# Patient Record
Sex: MALE | URBAN - METROPOLITAN AREA
[De-identification: names, ages, dates, MRNs, and addresses within clinical notes are randomized per-mention and may not be internally consistent; named-entity substitution may affect disease eponyms.]

---

## 2021-04-27 ENCOUNTER — OFFICE VISIT (OUTPATIENT)
Dept: URBAN - METROPOLITAN AREA CLINIC 48 | Facility: CLINIC | Age: 51
End: 2021-04-27
Payer: COMMERCIAL

## 2021-04-27 DIAGNOSIS — E11.3491 TYPE 2 DIAB W SEVERE NONPRLF DIAB RTNOP W/O MACULAR EDEMA, RIGHT EYE: Primary | ICD-10-CM

## 2021-04-27 DIAGNOSIS — E11.3392 TYPE 2 DIAB W MODERATE NONPRLF DIAB RTNOP W/O MACULAR EDEMA, LEFT EYE: ICD-10-CM

## 2021-04-27 DIAGNOSIS — D23.111 OTHER BENIGN NEOPLASM SKIN/ RIGHT UPPER EYELID, INC CANTHUS: ICD-10-CM

## 2021-04-27 PROCEDURE — 92004 COMPRE OPH EXAM NEW PT 1/>: CPT | Performed by: STUDENT IN AN ORGANIZED HEALTH CARE EDUCATION/TRAINING PROGRAM

## 2021-04-27 PROCEDURE — 92134 CPTRZ OPH DX IMG PST SGM RTA: CPT | Performed by: STUDENT IN AN ORGANIZED HEALTH CARE EDUCATION/TRAINING PROGRAM

## 2021-04-27 PROCEDURE — 92133 CPTRZD OPH DX IMG PST SGM ON: CPT | Performed by: STUDENT IN AN ORGANIZED HEALTH CARE EDUCATION/TRAINING PROGRAM

## 2021-04-27 ASSESSMENT — INTRAOCULAR PRESSURE
OD: 15
OS: 19

## 2021-04-27 NOTE — IMPRESSION/PLAN
Impression: Other benign neoplasm skin/ right upper eyelid, inc canthus: D23.111. Plan: Continue to monitor slight erythema if any growth concerning for [facility] can perform bx.

## 2021-04-27 NOTE — IMPRESSION/PLAN
Impression: Type 2 diab w severe nonprlf diab rtnop w/o macular edema, right eye: [id number]. Plan: Will refer to Dr. Santhosh Main for possible NVE can evaluate if PRP is needed.

## 2021-04-27 NOTE — IMPRESSION/PLAN
Impression: Type 2 diab w moderate nonprlf diab rtnop w/o macular edema, left eye: H37.3521.  Plan: see plan 1

## 2021-05-18 ENCOUNTER — OFFICE VISIT (OUTPATIENT)
Dept: URBAN - METROPOLITAN AREA CLINIC 48 | Facility: CLINIC | Age: 51
End: 2021-05-18
Payer: COMMERCIAL

## 2021-05-18 PROCEDURE — 92134 CPTRZ OPH DX IMG PST SGM RTA: CPT | Performed by: OPHTHALMOLOGY

## 2021-05-18 PROCEDURE — 92014 COMPRE OPH EXAM EST PT 1/>: CPT | Performed by: OPHTHALMOLOGY

## 2021-05-18 ASSESSMENT — INTRAOCULAR PRESSURE
OS: 17
OD: 14

## 2021-05-18 NOTE — IMPRESSION/PLAN
Impression: Other benign neoplasm skin/ right upper eyelid, inc canthus: D23.111. Plan: No loss of lashes no don't suspect malignancy.  

Continue to monitor

## 2021-05-18 NOTE — IMPRESSION/PLAN
Impression: Type 2 diab w severe nonprlf diabetic rtnop w macular edema, right eye: E11.3411. Plan: Advised to obtain good blood glucose control, discussed with patient although he has not lost vision if blood sugars are not controlled, vision can decrease. no laser at this time. Schedule Eylea 2mg OU x 2 every 4 weeks then 3 weeks De/OCT Deborrjuan antonio Lovell #1 0-10 days, please get auth Avastin is on Recall and patient is needing his injection this week.

## 2021-05-18 NOTE — IMPRESSION/PLAN
Impression: Type 2 diab w moderate nonprlf diab rtnop w macular edema, left eye: Y94.7965.  Plan: see plan 1

## 2021-05-21 ENCOUNTER — PROCEDURE (OUTPATIENT)
Dept: URBAN - METROPOLITAN AREA CLINIC 48 | Facility: CLINIC | Age: 51
End: 2021-05-21
Payer: COMMERCIAL

## 2021-06-18 ENCOUNTER — PROCEDURE (OUTPATIENT)
Dept: URBAN - METROPOLITAN AREA CLINIC 48 | Facility: CLINIC | Age: 51
End: 2021-06-18
Payer: COMMERCIAL

## 2021-06-18 DIAGNOSIS — E11.3312 TYPE 2 DIABETES MELLITUS WITH MODERATE NONPROLIFERATIVE DIABETIC RETINOPATHY WITH MACULAR EDEMA, LEFT EYE: ICD-10-CM

## 2021-06-18 DIAGNOSIS — E11.3411 TYPE 2 DIABETES MELLITUS WITH SEVERE NONPROLIFERATIVE DIABETIC RETINOPATHY WITH MACULAR EDEMA, RIGHT EYE: Primary | ICD-10-CM

## 2021-09-21 ENCOUNTER — OFFICE VISIT (OUTPATIENT)
Dept: URBAN - METROPOLITAN AREA CLINIC 48 | Facility: CLINIC | Age: 51
End: 2021-09-21
Payer: COMMERCIAL

## 2021-09-21 DIAGNOSIS — E11.3393 TYPE 2 DIAB W MODERATE NONPRLF DIAB RTNOP W/O MACULAR EDEMA, BILATERAL: Primary | ICD-10-CM

## 2021-09-21 PROCEDURE — 92134 CPTRZ OPH DX IMG PST SGM RTA: CPT | Performed by: OPHTHALMOLOGY

## 2021-09-21 PROCEDURE — 92014 COMPRE OPH EXAM EST PT 1/>: CPT | Performed by: OPHTHALMOLOGY

## 2021-09-21 ASSESSMENT — INTRAOCULAR PRESSURE
OD: 14
OS: 16

## 2021-09-21 NOTE — IMPRESSION/PLAN
Impression: Type 2 diab w moderate nonprlf diab rtnop w/o macular edema, bilateral: I91.5593. Plan: Advised to obtain good blood glucose control, no injection treatment today. If patient has any new changes in vision, patient to call office to be seen sooner.  



RTC 3 months DM/OCT mac